# Patient Record
Sex: FEMALE | URBAN - METROPOLITAN AREA
[De-identification: names, ages, dates, MRNs, and addresses within clinical notes are randomized per-mention and may not be internally consistent; named-entity substitution may affect disease eponyms.]

---

## 2022-07-09 ENCOUNTER — TELEPHONE ENCOUNTER (OUTPATIENT)
Dept: URBAN - METROPOLITAN AREA CLINIC 121 | Facility: CLINIC | Age: 72
End: 2022-07-09

## 2022-07-10 ENCOUNTER — TELEPHONE ENCOUNTER (OUTPATIENT)
Dept: URBAN - METROPOLITAN AREA CLINIC 121 | Facility: CLINIC | Age: 72
End: 2022-07-10

## 2022-07-28 ENCOUNTER — APPOINTMENT (RX ONLY)
Dept: URBAN - METROPOLITAN AREA CLINIC 333 | Facility: CLINIC | Age: 72
Setting detail: DERMATOLOGY
End: 2022-07-28

## 2022-07-29 ENCOUNTER — APPOINTMENT (RX ONLY)
Dept: URBAN - METROPOLITAN AREA CLINIC 148 | Facility: CLINIC | Age: 72
Setting detail: DERMATOLOGY
End: 2022-07-29

## 2022-07-29 DIAGNOSIS — D22 MELANOCYTIC NEVI: ICD-10-CM

## 2022-07-29 DIAGNOSIS — I87.2 VENOUS INSUFFICIENCY (CHRONIC) (PERIPHERAL): ICD-10-CM

## 2022-07-29 DIAGNOSIS — D18.0 HEMANGIOMA: ICD-10-CM

## 2022-07-29 DIAGNOSIS — L81.4 OTHER MELANIN HYPERPIGMENTATION: ICD-10-CM

## 2022-07-29 DIAGNOSIS — L82.1 OTHER SEBORRHEIC KERATOSIS: ICD-10-CM

## 2022-07-29 PROBLEM — D48.5 NEOPLASM OF UNCERTAIN BEHAVIOR OF SKIN: Status: ACTIVE | Noted: 2022-07-29

## 2022-07-29 PROBLEM — D22.5 MELANOCYTIC NEVI OF TRUNK: Status: ACTIVE | Noted: 2022-07-29

## 2022-07-29 PROBLEM — D18.01 HEMANGIOMA OF SKIN AND SUBCUTANEOUS TISSUE: Status: ACTIVE | Noted: 2022-07-29

## 2022-07-29 PROCEDURE — 99203 OFFICE O/P NEW LOW 30 MIN: CPT | Mod: 25

## 2022-07-29 PROCEDURE — 1123F ACP DISCUSS/DSCN MKR DOCD: CPT

## 2022-07-29 PROCEDURE — ? BIOPSY BY SHAVE METHOD

## 2022-07-29 PROCEDURE — ? FULL BODY SKIN EXAM

## 2022-07-29 PROCEDURE — ? COUNSELING

## 2022-07-29 PROCEDURE — G8427 DOCREV CUR MEDS BY ELIG CLIN: HCPCS

## 2022-07-29 PROCEDURE — 4004F PT TOBACCO SCREEN RCVD TLK: CPT

## 2022-07-29 PROCEDURE — 69100 BIOPSY OF EXTERNAL EAR: CPT

## 2022-07-29 PROCEDURE — ? SUNSCREEN RECOMMENDATIONS

## 2022-07-29 PROCEDURE — 7010F PT INFO INTO RECALL SYSTEM: CPT

## 2022-07-29 ASSESSMENT — LOCATION SIMPLE DESCRIPTION DERM
LOCATION SIMPLE: LEFT FOREARM
LOCATION SIMPLE: RIGHT PRETIBIAL REGION
LOCATION SIMPLE: LEFT PRETIBIAL REGION
LOCATION SIMPLE: RIGHT FOREARM
LOCATION SIMPLE: ABDOMEN
LOCATION SIMPLE: UPPER BACK
LOCATION SIMPLE: RIGHT FOREHEAD

## 2022-07-29 ASSESSMENT — LOCATION DETAILED DESCRIPTION DERM
LOCATION DETAILED: LEFT PROXIMAL DORSAL FOREARM
LOCATION DETAILED: RIGHT DISTAL DORSAL FOREARM
LOCATION DETAILED: RIGHT DISTAL PRETIBIAL REGION
LOCATION DETAILED: EPIGASTRIC SKIN
LOCATION DETAILED: SUPERIOR THORACIC SPINE
LOCATION DETAILED: LEFT DISTAL PRETIBIAL REGION
LOCATION DETAILED: INFERIOR THORACIC SPINE
LOCATION DETAILED: RIGHT INFERIOR LATERAL FOREHEAD

## 2022-07-29 ASSESSMENT — LOCATION ZONE DERM
LOCATION ZONE: TRUNK
LOCATION ZONE: ARM
LOCATION ZONE: FACE
LOCATION ZONE: LEG

## 2022-07-29 NOTE — PROCEDURE: MIPS CLAIM BASED REPORTING
Smoking Cessation Codes: Patient screened for tobacco use and received tobacco cessation intervention (counseling, pharmacotherapy, or both)
Detail Level: Detailed
Advanced Care Planning: Advance Care Planning discussed and documented advance care plan or surrogate decision maker documented in the medical record
Current Medications Obtained: Eligible clinician attests to documenting in the medical record they obtained, updated, or reviewed the patient's current medications
Smoking Cessation Modifiers: Please select an option if indicated
Recall: Patient information entered into a recall system that includes: target date for the next exam specified and a process to follow up with patients regarding missed or unscheduled appointments

## 2022-07-30 ENCOUNTER — TELEPHONE ENCOUNTER (OUTPATIENT)
Age: 72
End: 2022-07-30

## 2022-07-30 RX ORDER — ONDANSETRON HYDROCHLORIDE 4 MG/1
1 (ONE) TABLET, FILM COATED ORAL
Qty: 0 | Refills: 2 | OUTPATIENT
Start: 2013-12-31 | End: 2014-01-10

## 2022-07-30 RX ORDER — CIPROFLOXACIN HCL 500 MG
TABLET ORAL
Qty: 0 | Refills: 2 | OUTPATIENT
Start: 2013-12-31 | End: 2014-01-10

## 2022-07-30 RX ORDER — PEPPERMINT OIL 90 MG
1-2 CAPSULE CAPSULE, DELAYED, AND EXTENDED RELEASE ORAL
Qty: 0 | Refills: 2 | OUTPATIENT
Start: 2016-10-14 | End: 2016-11-13

## 2022-07-30 RX ORDER — METRONIDAZOLE 375 MG/1
CAPSULE ORAL
Qty: 0 | Refills: 2 | OUTPATIENT
Start: 2013-12-31 | End: 2014-01-10

## 2022-07-31 ENCOUNTER — TELEPHONE ENCOUNTER (OUTPATIENT)
Age: 72
End: 2022-07-31

## 2022-07-31 RX ORDER — METRONIDAZOLE 375 MG/1
CAPSULE ORAL
Qty: 0 | Refills: 2 | Status: ACTIVE | COMMUNITY
Start: 2013-12-31

## 2022-07-31 RX ORDER — CIPROFLOXACIN HCL 500 MG
TABLET ORAL
Qty: 0 | Refills: 2 | Status: ACTIVE | COMMUNITY
Start: 2013-12-31

## 2022-07-31 RX ORDER — PEPPERMINT OIL 90 MG
1-2 CAPSULE, DELAYED, AND EXTENDED RELEASE ORAL
Qty: 0 | Refills: 2 | Status: ACTIVE | COMMUNITY
Start: 2016-10-14

## 2022-07-31 RX ORDER — ONDANSETRON HYDROCHLORIDE 4 MG/1
1 (ONE) TABLET, FILM COATED ORAL
Qty: 0 | Refills: 2 | Status: ACTIVE | COMMUNITY
Start: 2013-12-31

## 2022-08-10 ENCOUNTER — APPOINTMENT (RX ONLY)
Dept: URBAN - METROPOLITAN AREA CLINIC 148 | Facility: CLINIC | Age: 72
Setting detail: DERMATOLOGY
End: 2022-08-10

## 2022-08-10 PROBLEM — C44.219 BASAL CELL CARCINOMA OF SKIN OF LEFT EAR AND EXTERNAL AURICULAR CANAL: Status: ACTIVE | Noted: 2022-08-10

## 2022-08-10 PROCEDURE — 17311 MOHS 1 STAGE H/N/HF/G: CPT

## 2022-08-10 PROCEDURE — 13152 CMPLX RPR E/N/E/L 2.6-7.5 CM: CPT

## 2022-08-10 PROCEDURE — ? PRESCRIPTION

## 2022-08-10 PROCEDURE — ? MOHS SURGERY

## 2022-08-10 RX ORDER — GENTAMICIN SULFATE 1 MG/G
OINTMENT TOPICAL
Qty: 15 | Refills: 1 | Status: ERX | COMMUNITY
Start: 2022-08-10

## 2022-08-10 RX ADMIN — GENTAMICIN SULFATE: 1 OINTMENT TOPICAL at 00:00

## 2022-08-10 NOTE — PROCEDURE: MOHS SURGERY
Not applicable Complex Repair And Graft Additional Text (Will Appearing After The Standard Complex Repair Text): The complex repair was not sufficient to completely close the primary defect. The remaining additional defect was repaired with the graft mentioned below.

## 2022-08-17 ENCOUNTER — APPOINTMENT (RX ONLY)
Dept: URBAN - METROPOLITAN AREA CLINIC 148 | Facility: CLINIC | Age: 72
Setting detail: DERMATOLOGY
End: 2022-08-17

## 2022-08-17 DIAGNOSIS — Z48.817 ENCOUNTER FOR SURGICAL AFTERCARE FOLLOWING SURGERY ON THE SKIN AND SUBCUTANEOUS TISSUE: ICD-10-CM

## 2022-08-17 PROCEDURE — ? POST-OP WOUND CHECK

## 2022-08-17 PROCEDURE — 99024 POSTOP FOLLOW-UP VISIT: CPT

## 2022-08-17 ASSESSMENT — LOCATION SIMPLE DESCRIPTION DERM: LOCATION SIMPLE: LEFT EAR

## 2022-08-17 ASSESSMENT — LOCATION ZONE DERM: LOCATION ZONE: EAR

## 2022-08-17 ASSESSMENT — LOCATION DETAILED DESCRIPTION DERM: LOCATION DETAILED: LEFT SCAPHA

## 2022-08-17 NOTE — PROCEDURE: POST-OP WOUND CHECK
Detail Level: Detailed
Add 74564 Cpt? (Important Note: In 2017 The Use Of 45407 Is Being Tracked By Cms To Determine Future Global Period Reimbursement For Global Periods): yes
Additional Comments: Will leave sutures in place x 1 additional week. Continue daily wound care with gentamicin ointment.

## 2022-08-24 ENCOUNTER — APPOINTMENT (RX ONLY)
Dept: URBAN - METROPOLITAN AREA CLINIC 148 | Facility: CLINIC | Age: 72
Setting detail: DERMATOLOGY
End: 2022-08-24

## 2022-08-24 DIAGNOSIS — Z48.02 ENCOUNTER FOR REMOVAL OF SUTURES: ICD-10-CM

## 2022-08-24 PROCEDURE — ? SUTURE REMOVAL (NO GLOBAL PERIOD)

## 2022-08-24 ASSESSMENT — LOCATION SIMPLE DESCRIPTION DERM: LOCATION SIMPLE: LEFT EAR

## 2022-08-24 ASSESSMENT — LOCATION ZONE DERM: LOCATION ZONE: EAR

## 2022-08-24 ASSESSMENT — LOCATION DETAILED DESCRIPTION DERM: LOCATION DETAILED: LEFT SCAPHA

## 2022-08-24 NOTE — PROCEDURE: SUTURE REMOVAL (NO GLOBAL PERIOD)
Detail Level: Detailed
Echocardiogram revealed mild to moderate aortic stenosis. Patient also has underlying left ventricular outflow obstruction. Patient will require cardiology evaluation preoperatively for clearance. There is no clinical evidence of congestive heart failure.

## 2024-04-01 NOTE — PROCEDURE: MOHS SURGERY
Graft Donor Site Bandage (Optional-Leave Blank If You Don't Want In Note): Pressure dressing applied to the donor site. BMI: BMI (kg/m2): 32.9 (03-31-24 @ 18:14)  HbA1c: A1C with Estimated Average Glucose Result: 10.7 % (06-08-23 @ 07:00)    Glucose: POCT Blood Glucose.: 337 mg/dL (04-01-24 @ 06:51)    BP: 141/70 (04-01-24 @ 06:55) (132/90 - 141/70)Vital Signs Last 24 Hrs  T(C): 36.4 (03-31-24 @ 18:14), Max: 36.7 (03-31-24 @ 14:48)  T(F): 97.5 (03-31-24 @ 18:14), Max: 98 (03-31-24 @ 14:48)  HR: 111 (04-01-24 @ 06:55) (71 - 111)  BP: 141/70 (04-01-24 @ 06:55) (132/90 - 141/70)  BP(mean): --  RR: 16 (03-31-24 @ 14:48) (16 - 16)  SpO2: 97% (03-31-24 @ 14:48) (97% - 97%)      Lipid Panel:  BMI: BMI (kg/m2): 32.9 (03-31-24 @ 18:14)  HbA1c: A1C with Estimated Average Glucose Result: 12.1 % (04-01-24 @ 07:30)    Glucose: POCT Blood Glucose.: 305 mg/dL (04-02-24 @ 06:42)    BP: 151/85 (04-02-24 @ 08:29) (124/79 - 151/85)Vital Signs Last 24 Hrs  T(C): 35.7 (04-02-24 @ 08:29), Max: 35.8 (04-01-24 @ 11:08)  T(F): 96.3 (04-02-24 @ 08:29), Max: 96.5 (04-01-24 @ 11:08)  HR: 127 (04-02-24 @ 08:29) (105 - 127)  BP: 151/85 (04-02-24 @ 08:29) (124/79 - 151/85)  BP(mean): --  RR: 16 (04-02-24 @ 08:29) (16 - 18)  SpO2: --      Lipid Panel:  BMI: BMI (kg/m2): 32.9 (03-31-24 @ 18:14)  HbA1c: A1C with Estimated Average Glucose Result: 12.1 % (04-01-24 @ 07:30)    Glucose: POCT Blood Glucose.: 272 mg/dL (04-03-24 @ 06:58)    BP: 105/68 (04-02-24 @ 15:39) (105/68 - 151/85)Vital Signs Last 24 Hrs  T(C): 37.1 (04-02-24 @ 15:39), Max: 37.1 (04-02-24 @ 15:39)  T(F): 98.7 (04-02-24 @ 15:39), Max: 98.7 (04-02-24 @ 15:39)  HR: 105 (04-02-24 @ 15:39) (105 - 105)  BP: 105/68 (04-02-24 @ 15:39) (105/68 - 105/68)  BP(mean): --  RR: 17 (04-02-24 @ 15:39) (17 - 17)  SpO2: --      Lipid Panel:

## 2024-04-25 ENCOUNTER — LAB (OUTPATIENT)
Dept: URBAN - METROPOLITAN AREA CLINIC 7 | Facility: CLINIC | Age: 74
End: 2024-04-25

## 2024-04-25 ENCOUNTER — OV NP (OUTPATIENT)
Dept: URBAN - METROPOLITAN AREA CLINIC 7 | Facility: CLINIC | Age: 74
End: 2024-04-25
Payer: MEDICARE

## 2024-04-25 VITALS
WEIGHT: 211 LBS | SYSTOLIC BLOOD PRESSURE: 158 MMHG | HEIGHT: 66 IN | TEMPERATURE: 97.8 F | DIASTOLIC BLOOD PRESSURE: 74 MMHG | BODY MASS INDEX: 33.91 KG/M2

## 2024-04-25 DIAGNOSIS — E11.9 TYPE 2 DIABETES MELLITUS WITHOUT COMPLICATIONS: ICD-10-CM

## 2024-04-25 DIAGNOSIS — Z99.81 OXYGEN DEPENDENT: ICD-10-CM

## 2024-04-25 DIAGNOSIS — Z79.4 LONG TERM (CURRENT) USE OF INSULIN: ICD-10-CM

## 2024-04-25 DIAGNOSIS — I50.9 CHRONIC CONGESTIVE HEART FAILURE, UNSPECIFIED HEART FAILURE TYPE: ICD-10-CM

## 2024-04-25 DIAGNOSIS — Z99.2 DIALYSIS PATIENT: ICD-10-CM

## 2024-04-25 DIAGNOSIS — R19.5 OCCULT GASTROINTESTINAL HEMORRHAGE: ICD-10-CM

## 2024-04-25 DIAGNOSIS — K52.9 CHRONIC DIARRHEA: ICD-10-CM

## 2024-04-25 DIAGNOSIS — D50.8 ANEMIA, IRON DEFICIENCY, INADEQUATE DIETARY INTAKE: ICD-10-CM

## 2024-04-25 DIAGNOSIS — J84.10 PULMONARY FIBROSIS: ICD-10-CM

## 2024-04-25 DIAGNOSIS — N18.9 CHRONIC RENAL FAILURE, UNSPECIFIED CKD STAGE: ICD-10-CM

## 2024-04-25 PROBLEM — 51615001: Status: ACTIVE | Noted: 2024-04-25

## 2024-04-25 PROBLEM — 313436004: Status: ACTIVE | Noted: 2024-04-25

## 2024-04-25 PROBLEM — 90688005: Status: ACTIVE | Noted: 2024-04-25

## 2024-04-25 PROBLEM — 236071009: Status: ACTIVE | Noted: 2024-04-25

## 2024-04-25 PROBLEM — 710815001: Status: ACTIVE | Noted: 2024-04-25

## 2024-04-25 PROBLEM — 931000119107: Status: ACTIVE | Noted: 2024-04-25

## 2024-04-25 PROBLEM — 59614000: Status: ACTIVE | Noted: 2024-04-25

## 2024-04-25 PROBLEM — 371315009: Status: ACTIVE | Noted: 2024-04-25

## 2024-04-25 PROBLEM — 88805009: Status: ACTIVE | Noted: 2024-04-25

## 2024-04-25 PROBLEM — 105502003: Status: ACTIVE | Noted: 2024-04-25

## 2024-04-25 PROCEDURE — 99204 OFFICE O/P NEW MOD 45 MIN: CPT | Performed by: INTERNAL MEDICINE

## 2024-04-25 RX ORDER — HUMAN INSULIN 100 [IU]/ML
AS DIRECTED INJECTION, SUSPENSION SUBCUTANEOUS
Status: ACTIVE | COMMUNITY
Start: 2024-04-25

## 2024-04-25 RX ORDER — CINACALCET HYDROCHLORIDE 30 MG/1
1 TABLET WITH FOOD OR AFTER A MEAL TABLET, COATED ORAL ONCE A DAY
Qty: 30 | Status: ACTIVE | COMMUNITY
Start: 2024-04-25 | End: 2024-05-25

## 2024-04-25 RX ORDER — METRONIDAZOLE 375 MG/1
CAPSULE ORAL
Qty: 0 | Refills: 2 | Status: ON HOLD | COMMUNITY
Start: 2013-12-31

## 2024-04-25 RX ORDER — METOPROLOL TARTRATE 25 MG/1
TABLET, FILM COATED ORAL
Qty: 180 TABLET | Status: ACTIVE | COMMUNITY

## 2024-04-25 RX ORDER — TORSEMIDE 20 MG/1
TABLET ORAL
Qty: 300 TABLET | Status: ACTIVE | COMMUNITY

## 2024-04-25 RX ORDER — HYDRALAZINE HYDROCHLORIDE 50 MG/1
TAKE ONE TABLET BY MOUTH THREE TIMES A DAY AS DIRECTED TABLET ORAL
Qty: 90 UNSPECIFIED | Refills: 0 | Status: ACTIVE | COMMUNITY

## 2024-04-25 RX ORDER — HUMAN INSULIN 100 [IU]/ML
AS DIRECTED INJECTION, SOLUTION SUBCUTANEOUS
Status: ACTIVE | COMMUNITY
Start: 2024-04-25

## 2024-04-25 RX ORDER — ROSUVASTATIN CALCIUM 20 MG/1
TABLET, FILM COATED ORAL
Qty: 100 TABLET | Status: ACTIVE | COMMUNITY

## 2024-04-25 RX ORDER — CIPROFLOXACIN HCL 500 MG
TABLET ORAL
Qty: 0 | Refills: 2 | Status: ON HOLD | COMMUNITY
Start: 2013-12-31

## 2024-04-25 RX ORDER — SEVELAMER HYDROCHLORIDE 800 MG/1
1 TABLET WITH MEALS TABLET, FILM COATED ORAL THREE TIMES A DAY
Qty: 90 | Status: ACTIVE | COMMUNITY
Start: 2024-04-25 | End: 2024-05-25

## 2024-04-25 RX ORDER — ALLOPURINOL 100 MG/1
TABLET ORAL
Qty: 100 TABLET | Status: ACTIVE | COMMUNITY

## 2024-04-25 RX ORDER — ONDANSETRON HYDROCHLORIDE 4 MG/1
1 (ONE) TABLET, FILM COATED ORAL
Qty: 0 | Refills: 2 | Status: ON HOLD | COMMUNITY
Start: 2013-12-31

## 2024-04-25 RX ORDER — PEPPERMINT OIL 90 MG
1-2 CAPSULE, DELAYED, AND EXTENDED RELEASE ORAL
Qty: 0 | Refills: 2 | Status: ON HOLD | COMMUNITY
Start: 2016-10-14

## 2024-04-25 RX ORDER — FOLIC ACID/VIT B COMPLEX AND C 0.8 MG
1 TABLET TABLET ORAL ONCE A DAY
Qty: 30 | Status: ACTIVE | COMMUNITY
Start: 2024-04-25

## 2024-04-25 NOTE — HPI-TODAY'S VISIT:
Seen today after recent lab finding of anemia and then FOBT+ . No overt bleeding No change in bowel habits but hx of chronic diarrhea since at least 2020 at which time had colonoscopy negative for microscopic colitis. Has done trial immodium etc without improvement. Hx chronic renal failure on 3x weekly dialysis. Now on cont oxygen support with hx of pulmonary fibrosis.Undergoing eval [facility] and has fu pending for may. No fevers or chills. No weight loss. Last [id number] No overt bleeding No fevers or chills No dietary changes,new medications or sick contacts. No family history CRC,IBD.

## 2024-05-06 ENCOUNTER — TELEPHONE ENCOUNTER (OUTPATIENT)
Dept: URBAN - METROPOLITAN AREA CLINIC 7 | Facility: CLINIC | Age: 74
End: 2024-05-06

## 2024-05-08 ENCOUNTER — TELEPHONE ENCOUNTER (OUTPATIENT)
Dept: URBAN - METROPOLITAN AREA CLINIC 7 | Facility: CLINIC | Age: 74
End: 2024-05-08

## 2024-05-10 ENCOUNTER — TELEPHONE ENCOUNTER (OUTPATIENT)
Dept: URBAN - METROPOLITAN AREA CLINIC 7 | Facility: CLINIC | Age: 74
End: 2024-05-10

## 2024-12-19 ENCOUNTER — TELEPHONE ENCOUNTER (OUTPATIENT)
Dept: URBAN - METROPOLITAN AREA CLINIC 7 | Facility: CLINIC | Age: 74
End: 2024-12-19

## 2025-01-07 NOTE — PROCEDURE: MOHS SURGERY
Area H Indication Text: Tumors in this location are included in Area H (eyelids, eyebrows, nose, lips, chin, ear, pre-auricular, post-auricular, temple, genitalia, hands, feet, ankles and areola).  Tissue conservation is critical in these anatomic locations. Improved.